# Patient Record
Sex: FEMALE | Race: WHITE | Employment: UNEMPLOYED | URBAN - METROPOLITAN AREA
[De-identification: names, ages, dates, MRNs, and addresses within clinical notes are randomized per-mention and may not be internally consistent; named-entity substitution may affect disease eponyms.]

---

## 2017-05-24 LAB
CHOLESTEROL: 151 MG/DL
GLUCOSE BLD-MCNC: 85 MG/DL (ref 70–140)
HDLC SERPL-MCNC: 78 MG/DL
LDL CHOLESTEROL CALCULATED: 63 MG/DL
PATIENT FASTING?: YES
TRIGL SERPL-MCNC: 52 MG/DL

## 2017-05-25 ENCOUNTER — EMPLOYEE WELLNESS (OUTPATIENT)
Dept: OTHER | Age: 37
End: 2017-05-25

## 2018-03-20 VITALS — WEIGHT: 163 LBS

## 2020-05-20 ENCOUNTER — HOSPITAL ENCOUNTER (OUTPATIENT)
Dept: GENERAL RADIOLOGY | Age: 40
Discharge: HOME | End: 2020-05-20
Payer: COMMERCIAL

## 2020-05-20 ENCOUNTER — OFFICE VISIT (OUTPATIENT)
Dept: FAMILY MEDICINE CLINIC | Age: 40
End: 2020-05-20
Payer: COMMERCIAL

## 2020-05-20 ENCOUNTER — HOSPITAL ENCOUNTER (OUTPATIENT)
Age: 40
Discharge: HOME | End: 2020-05-20
Payer: COMMERCIAL

## 2020-05-20 VITALS
OXYGEN SATURATION: 98 % | HEART RATE: 88 BPM | SYSTOLIC BLOOD PRESSURE: 140 MMHG | DIASTOLIC BLOOD PRESSURE: 92 MMHG | TEMPERATURE: 98.8 F

## 2020-05-20 PROCEDURE — 73610 X-RAY EXAM OF ANKLE: CPT

## 2020-05-20 PROCEDURE — G8427 DOCREV CUR MEDS BY ELIG CLIN: HCPCS | Performed by: NURSE PRACTITIONER

## 2020-05-20 PROCEDURE — G8421 BMI NOT CALCULATED: HCPCS | Performed by: NURSE PRACTITIONER

## 2020-05-20 PROCEDURE — 1036F TOBACCO NON-USER: CPT | Performed by: NURSE PRACTITIONER

## 2020-05-20 PROCEDURE — 99202 OFFICE O/P NEW SF 15 MIN: CPT | Performed by: NURSE PRACTITIONER

## 2020-05-20 RX ORDER — IBUPROFEN 200 MG
200 TABLET ORAL EVERY 6 HOURS PRN
COMMUNITY

## 2020-05-20 RX ORDER — NORGESTIMATE AND ETHINYL ESTRADIOL 7DAYSX3 28
1 KIT ORAL DAILY
COMMUNITY

## 2020-05-20 SDOH — HEALTH STABILITY: MENTAL HEALTH: HOW OFTEN DO YOU HAVE A DRINK CONTAINING ALCOHOL?: NEVER

## 2020-05-20 ASSESSMENT — PATIENT HEALTH QUESTIONNAIRE - PHQ9
SUM OF ALL RESPONSES TO PHQ9 QUESTIONS 1 & 2: 0
SUM OF ALL RESPONSES TO PHQ QUESTIONS 1-9: 0
1. LITTLE INTEREST OR PLEASURE IN DOING THINGS: 0
SUM OF ALL RESPONSES TO PHQ QUESTIONS 1-9: 0
2. FEELING DOWN, DEPRESSED OR HOPELESS: 0

## 2020-05-20 ASSESSMENT — ENCOUNTER SYMPTOMS
DIARRHEA: 0
RESPIRATORY NEGATIVE: 1
VOMITING: 0
NAUSEA: 0

## 2020-05-28 ENCOUNTER — OFFICE VISIT (OUTPATIENT)
Dept: ORTHOPEDIC SURGERY | Age: 40
End: 2020-05-28
Payer: COMMERCIAL

## 2020-05-28 VITALS — WEIGHT: 160 LBS | RESPIRATION RATE: 16 BRPM | BODY MASS INDEX: 27.31 KG/M2 | HEIGHT: 64 IN

## 2020-05-28 PROCEDURE — 27786 TREATMENT OF ANKLE FRACTURE: CPT | Performed by: ORTHOPAEDIC SURGERY

## 2020-05-28 PROCEDURE — 99203 OFFICE O/P NEW LOW 30 MIN: CPT | Performed by: ORTHOPAEDIC SURGERY

## 2020-05-28 PROCEDURE — G8419 CALC BMI OUT NRM PARAM NOF/U: HCPCS | Performed by: ORTHOPAEDIC SURGERY

## 2020-05-28 PROCEDURE — 1036F TOBACCO NON-USER: CPT | Performed by: ORTHOPAEDIC SURGERY

## 2020-05-28 PROCEDURE — G8427 DOCREV CUR MEDS BY ELIG CLIN: HCPCS | Performed by: ORTHOPAEDIC SURGERY

## 2020-05-28 RX ORDER — HYDROCODONE BITARTRATE AND ACETAMINOPHEN 5; 325 MG/1; MG/1
1 TABLET ORAL EVERY 8 HOURS PRN
Qty: 15 TABLET | Refills: 0 | Status: SHIPPED | OUTPATIENT
Start: 2020-05-28 | End: 2020-06-02

## 2020-05-28 RX ORDER — CHOLECALCIFEROL (VITAMIN D3) 1250 MCG
CAPSULE ORAL
COMMUNITY

## 2020-05-28 ASSESSMENT — ENCOUNTER SYMPTOMS
SHORTNESS OF BREATH: 0
CHEST TIGHTNESS: 0
EYE REDNESS: 0
WHEEZING: 0
COLOR CHANGE: 0
EYE PAIN: 0
VOMITING: 0

## 2020-05-28 NOTE — PROGRESS NOTES
5/28/2020   Chief Complaint   Patient presents with    Fracture     right distal fibula 5/17/2020        History of Present Illness:                             Kadien Velazquez is a 36 y.o. female presents today for evaluation of her right ankle pain and swelling. She had an injury on 5/17/2020 when she fell with a twisting injury to her right ankle. She had immediate pain and swelling maximally over the lateral aspect of her ankle after the fall. She was able to perform some partial weightbearing activities after the fall but does complain of significant discomfort in the lateral aspect of her ankle with any attempted movement or weightbearing activities of the ankle. She was seen by her primary care provider who ordered x-rays and referred her here for the fracture. Her pain is better with rest and elevation. She has not been immobilized but has been using an Ace wrap. Patient here for a new patient consult per Bulmaro Garcia CNP, for evaluation of right ankle pain due to an injury on 5/17/2020. She has not been WB on the ankle since a few days after being seen on 5/20. She rates her pain a 5/10 today. No prior history to this ankle.     Provider needs to conduct a hands on physical today due to patients injury     XR RIGHT ANKLE 5/20/2020  Impression   Acute nondisplaced traumatic fracture of the distal fibula/lateral malleolus.       The findings were sent to the Radiology Results [address] at 3:39   pm on 5/20/2020to be communicated to a licensed caregiver.              Medical History  Patient's medications, allergies, past medical, surgical, social and family histories were reviewed and updated as appropriate. No past medical history on file. No family history on file.   Social History     Socioeconomic History    Marital status: Single     Spouse name: None    Number of children: None    Years of education: None    Highest education level: None   Occupational History    None   Social Needs    Financial resource strain: None    Food insecurity     Worry: None     Inability: None    Transportation needs     Medical: None     Non-medical: None   Tobacco Use    Smoking status: Never Smoker    Smokeless tobacco: Never Used   Substance and Sexual Activity    Alcohol use: Never     Frequency: Never    Drug use: Never    Sexual activity: None   Lifestyle    Physical activity     Days per week: None     Minutes per session: None    Stress: None   Relationships    Social connections     Talks on phone: None     Gets together: None     Attends Christianity service: None     Active member of club or organization: None     Attends meetings of clubs or organizations: None     Relationship status: None    Intimate partner violence     Fear of current or ex partner: None     Emotionally abused: None     Physically abused: None     Forced sexual activity: None   Other Topics Concern    None   Social History Narrative    None     Current Outpatient Medications   Medication Sig Dispense Refill    Multiple Vitamins-Minerals (MULTI COMPLETE PO) Take by mouth      BIOTIN MAXIMUM PO Take by mouth      Cholecalciferol (VITAMIN D3) 1.25 MG (19738 UT) CAPS Take by mouth      Cetirizine-Pseudoephedrine (ALLERGY D-12 PO) Take by mouth      ibuprofen (ADVIL;MOTRIN) 200 MG tablet Take 200 mg by mouth every 6 hours as needed for Pain Take 2 as needed      Norgestim-Eth Estrad Triphasic (TRI-SPRINTEC) 0.18/0.215/0.25 MG-35 MCG TABS Take 1 tablet by mouth daily       No current facility-administered medications for this visit. No Known Allergies    Review of Systems:   Review of Systems   Constitutional: Negative for chills and fever. HENT: Negative for congestion and sneezing. Eyes: Negative for pain and redness. Respiratory: Negative for chest tightness, shortness of breath and wheezing. Cardiovascular: Negative for chest pain and palpitations.    Gastrointestinal: Negative for

## 2020-05-28 NOTE — PATIENT INSTRUCTIONS
Fitted for tall boot  Wear boot full time, remove for hygiene and sleep   Ok for partial weightbearing as needed   Crutches provided today  Script for norco provided, take as directed   Follow up in 1 week with x-rays

## 2020-06-04 ENCOUNTER — OFFICE VISIT (OUTPATIENT)
Dept: ORTHOPEDIC SURGERY | Age: 40
End: 2020-06-04

## 2020-06-04 VITALS
RESPIRATION RATE: 16 BRPM | HEART RATE: 76 BPM | WEIGHT: 160 LBS | OXYGEN SATURATION: 91 % | BODY MASS INDEX: 27.31 KG/M2 | HEIGHT: 64 IN

## 2020-06-04 PROCEDURE — 99024 POSTOP FOLLOW-UP VISIT: CPT | Performed by: ORTHOPAEDIC SURGERY

## 2020-06-04 NOTE — PROGRESS NOTES
6/4/2020   Chief Complaint   Patient presents with    Fracture     right distal fibula fx Hogue  injury 5/17/2020        History of Present Illness:                             Dania Romero is a 36 y.o. female who returns today for follow-up of her right ankle fracture. She has done well in her boot and feels that her pain and swelling are continuing to improve regularly. She has been able to perform some limited weightbearing to the ankle with her boot in place but continues to have pain with any pressure or weightbearing activities referred to the lateral aspect of the ankle. Patient returns today for FU of the right distal fibula fx Hogue injury 5/17/2020. Patient states pain today is a 3/10. She states that she is starting to feel overall better. The swelling has gone down in the right ankle. Patient states she has been wearing her boot at all times besides sleeping. Patient states that she has been using an ace wrap at night on her ankle. Patient denies any new injury or accident. Medical History  Patient's medications, allergies, past medical, surgical, social and family histories were reviewed and updated as appropriate. Review of Systems:   Review of Systems                                            Examination:  General Exam:  Vitals: Pulse 76   Resp 16   Ht 5' 4\" (1.626 m)   Wt 160 lb (72.6 kg)   LMP 05/14/2020 (Approximate)   SpO2 91%   BMI 27.46 kg/m²    Physical Exam   Right lower extremity:  Improved mild to moderate tenderness to palpation overlying the distal fibula with associated mild soft tissue swelling. No instability with gentle passive range of motion of dorsiflexion plantarflexion of the ankle. Sensation motor function is intact throughout. No tenderness to palpation overlying the medial malleolus. Mild tenderness across the anterior joint line of the ankle.       Diagnostic testing:  X-rays reviewed in office, I independently reviewed the films in the office today:     Xr Ankle Right (min 3 Views)    Result Date: 6/4/2020  3 views of the ankle show stable alignment of the nondisplaced Hogue B distal fibula fracture with maintained alignment of the ankle mortise and no widening of the syndesmosis or medial clear space. Office Procedures:  No orders of the defined types were placed in this encounter. Assessment and Plan  1. Right ankle nondisplaced Hogue B distal fibula fracture    I recommend that we continue with her a conservative treatment plan. I have recommended advancement of her weightbearing activities as tolerated in the boot. I have shown her gentle range of motion activities for the foot and toes to perform outside of the boot. She can continue to use her crutches as needed for support and protection while walking. I would like her to follow-up in 2 weeks for repeat x-rays to evaluate the fracture healing and alignment. If she is doing well we may consider transitioning to a brace at that time.         Electronically signed by Munir Basilio MD on 6/4/2020 at 2:49 PM

## 2020-06-04 NOTE — PATIENT INSTRUCTIONS
Continue weight-bearing as tolerated in cam boot   Continue range of motion exercises as instructed. Ice and elevate as needed. Tylenol or Motrin for pain.   Follow up in 2 weeks

## 2020-06-18 ENCOUNTER — OFFICE VISIT (OUTPATIENT)
Dept: ORTHOPEDIC SURGERY | Age: 40
End: 2020-06-18

## 2020-06-18 VITALS — HEIGHT: 64 IN | WEIGHT: 160.5 LBS | RESPIRATION RATE: 16 BRPM | BODY MASS INDEX: 27.4 KG/M2

## 2020-06-18 PROCEDURE — 99024 POSTOP FOLLOW-UP VISIT: CPT | Performed by: ORTHOPAEDIC SURGERY

## 2020-07-16 ENCOUNTER — OFFICE VISIT (OUTPATIENT)
Dept: ORTHOPEDIC SURGERY | Age: 40
End: 2020-07-16

## 2020-07-16 VITALS — WEIGHT: 160 LBS | BODY MASS INDEX: 27.31 KG/M2 | RESPIRATION RATE: 16 BRPM | HEIGHT: 64 IN

## 2020-07-16 PROCEDURE — 99024 POSTOP FOLLOW-UP VISIT: CPT | Performed by: ORTHOPAEDIC SURGERY

## 2020-07-16 NOTE — PROGRESS NOTES
Patient here for a follow up on her right distal fibula fracture, DOI 5/17/2020 she is about 2 months out from injury. She is in her ankle lace-up brace while ambulating and feels she is improving. She is still having some swelling in the ankle but no new issues. She does work on ROM of the ankle at home.     Provider needs to conduct a hands on physical today due to patients injury/diagnosis

## 2020-07-19 NOTE — PROGRESS NOTES
7/16/2020   Chief Complaint   Patient presents with    Ankle Injury     closed fracture of distal end of right fibula         History of Present Illness:                             Edd Buckner is a 36 y.o. female who returns today for follow-up of her right ankle fracture. She is doing well improving with her ankle but does not feel that she has recovered fully at this time. She is also has pain and swelling on the lateral aspect of the ankle but denies any instability issues. She has been using her lace up ankle brace and feels this gives her ankle good support and protection. Patient here for a follow up on her right distal fibula fracture, DOI 5/17/2020 she is about 2 months out from injury. She is in her ankle lace-up brace while ambulating and feels she is improving. She is still having some swelling in the ankle but no new issues. She does work on ROM of the ankle at home. Medical History  Patient's medications, allergies, past medical, surgical, social and family histories were reviewed and updated as appropriate. Review of Systems:   Review of Systems                                            Examination:  General Exam:  Vitals: Resp 16   Ht 5' 4\" (1.626 m)   Wt 160 lb (72.6 kg)   BMI 27.46 kg/m²    Physical Exam   Right lower extremity:  Improved mild tenderness and persistent mild swelling present of the lateral aspect of the ankle at the distal fibula fracture site. No instability with gentle active and passive range of motion of the ankle. Mild restricted range of motion at the ankle with discomfort at the extremes of motion. Strength is 5 out of 5 with ankle dorsiflexion and plantarflexion.       Diagnostic testing:  X-rays reviewed in office, I independently reviewed the films in the office today:     Repeat x-rays show stable alignment of the distal fibula fracture with interval bony healing at the fracture site and maintained alignment of the ankle mortise    Office Procedures:  No orders of the defined types were placed in this encounter. Assessment and Plan  1. Right distal fibula fracture    I reviewed the x-rays with her and explained there is stable alignment of the nondisplaced spiral oblique Hogue B distal fibula fracture. There is interval healing and callus formation at the fracture site. There is maintained alignment of the ankle mortise with no widening of the syndesmosis or medial clear space. I have explained to her that the fracture is healing well and I would recommend continued protection as needed in brace and avoidance of painful and strenuous activities at this time. We discussed the likelihood that the fracture will continue to improve and heal over the next month or 2. I would like her to follow-up in 4 weeks for repeat x-rays to evaluate fracture healing and alignment.         Electronically signed by Iglesia Delgado MD on 7/19/2020 at 5:19 PM

## 2020-09-10 ENCOUNTER — OFFICE VISIT (OUTPATIENT)
Dept: ORTHOPEDIC SURGERY | Age: 40
End: 2020-09-10
Payer: COMMERCIAL

## 2020-09-10 VITALS
OXYGEN SATURATION: 98 % | WEIGHT: 160 LBS | HEART RATE: 84 BPM | BODY MASS INDEX: 27.31 KG/M2 | HEIGHT: 64 IN | RESPIRATION RATE: 15 BRPM

## 2020-09-10 PROCEDURE — G8419 CALC BMI OUT NRM PARAM NOF/U: HCPCS | Performed by: ORTHOPAEDIC SURGERY

## 2020-09-10 PROCEDURE — 1036F TOBACCO NON-USER: CPT | Performed by: ORTHOPAEDIC SURGERY

## 2020-09-10 PROCEDURE — 99213 OFFICE O/P EST LOW 20 MIN: CPT | Performed by: ORTHOPAEDIC SURGERY

## 2020-09-10 PROCEDURE — G8427 DOCREV CUR MEDS BY ELIG CLIN: HCPCS | Performed by: ORTHOPAEDIC SURGERY

## 2020-09-10 ASSESSMENT — ENCOUNTER SYMPTOMS
SHORTNESS OF BREATH: 0
CHEST TIGHTNESS: 0
COLOR CHANGE: 0

## 2020-09-10 NOTE — PROGRESS NOTES
9/10/2020   Chief Complaint   Patient presents with    Ankle Injury     R-distal fibula fx mathis injury DOI 5/17/20        History of Present Illness:                             Christopher Wilcox is a 36 y.o. female who returns today for follow-up of her right ankle fracture. She has been using her brace for support. She does have pain and swelling of the right ankle especially the end of the day with repetitive activities. .  She does continue have a stiffness and increased discomfort with twisting and rotational movements of the ankle. Pain is isolated to the lateral aspect of the ankle at the fracture site. Pt returns today for R-distal fibula Fx mathis injury DOI 5/17/20. Pt states pain today is a 1/10. Pt states that she has been wearing her brace to help with support. She will have some swelling of the right ankle at the end of the day. Once she sits down and relaxes she will have reduced swelling. She does use ice to help with the swelling. Medical History  Patient's medications, allergies, past medical, surgical, social and family histories were reviewed and updated as appropriate. Review of Systems:   Review of Systems   Constitutional: Negative for activity change and fever. Respiratory: Negative for chest tightness and shortness of breath. Cardiovascular: Negative for chest pain. Skin: Negative for color change. Neurological: Negative for dizziness. Psychiatric/Behavioral: The patient is not nervous/anxious. Examination:  General Exam:  Vitals: Pulse 84   Resp 15   Ht 5' 4\" (1.626 m)   Wt 160 lb (72.6 kg)   SpO2 98%   BMI 27.46 kg/m²    Physical Exam  Vitals signs and nursing note reviewed. Constitutional:       Appearance: Normal appearance. HENT:      Head: Normocephalic and atraumatic. Eyes:      Conjunctiva/sclera: Conjunctivae normal.      Pupils: Pupils are equal, round, and reactive to light.    Neck: Musculoskeletal: Normal range of motion. Pulmonary:      Effort: Pulmonary effort is normal.   Musculoskeletal:      Right knee: She exhibits normal range of motion, no swelling, no deformity and no laceration. No tenderness found. Left ankle: She exhibits normal range of motion, no swelling, no ecchymosis, no deformity, no laceration and normal pulse. No tenderness. Achilles tendon normal.      Comments: Right lower extremity:  There is mild tenderness to palpation and swelling overlying the lateral aspect of the ankle at the distal fibula. Normal alignment of the ankle joint. There is mild restricted range of motion in ankle dorsiflexion and plantarflexion with pain referred to the lateral aspect of the ankle at the extremes of motion. Moderate restricted range of motion with inversion and eversion of the hindfoot. No instability with inversion stress testing and external rotation stress testing but there is pain referred to the lateral aspect of the ankle of the distal fibula with stress examination. Sensation and motor function is intact throughout the foot. Skin is intact. 2+ DP pulse and brisk cap refill present. Skin:     General: Skin is warm and dry. Neurological:      Mental Status: She is alert and oriented to person, place, and time. Psychiatric:         Mood and Affect: Mood normal.         Behavior: Behavior normal.              Diagnostic testing:  X-rays reviewed in office, I independently reviewed the films in the office today:   Xr Ankle Right (min 3 Views)    Result Date: 9/10/2020  3 views of the ankle show stable alignment of the Hogue B oblique distal fibula fracture with persistent fracture line present and mild posterior lateral displacement of the distal fragment less than 2 mm. Normal alignment of the ankle mortise and syndesmosis. No significant interval bony healing or callus formation present.         Office Procedures:  No orders of the defined types were placed in this encounter. Assessment and Plan  1. 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 There has been no progression of the bony healing since the previous x-rays. Based on the chronicity of her problem and the lack of healing I have diagnosed her as a nonunion. We discussed the healing process and have explained to her that her fracture is taking longer to heal than expected. Due to her ongoing symptoms and lack of healing on the x-rays, I have recommended we proceed with a bone stimulator to help aid in the healing of her fibula fracture. I have sent a referral to [facility] for this. I would like her to follow-up here in 6 weeks for a recheck and x-rays. She can continue with light activities and use of her brace for protection during ambulation.         Electronically signed by Tracey Ovalle MD on 9/10/2020 at 2:22 PM

## 2020-09-10 NOTE — PATIENT INSTRUCTIONS
Continue weight-bearing as tolerated. Continue range of motion exercises as instructed. Ice and elevate as needed. Tylenol or Motrin for pain.   Follow up in 6 weeks   Get bone stimulator approved

## 2020-12-03 ENCOUNTER — OFFICE VISIT (OUTPATIENT)
Dept: ORTHOPEDIC SURGERY | Age: 40
End: 2020-12-03
Payer: COMMERCIAL

## 2020-12-03 VITALS
HEART RATE: 100 BPM | OXYGEN SATURATION: 98 % | BODY MASS INDEX: 27.31 KG/M2 | WEIGHT: 160 LBS | HEIGHT: 64 IN | RESPIRATION RATE: 16 BRPM

## 2020-12-03 PROCEDURE — 99213 OFFICE O/P EST LOW 20 MIN: CPT | Performed by: ORTHOPAEDIC SURGERY

## 2020-12-03 PROCEDURE — G8427 DOCREV CUR MEDS BY ELIG CLIN: HCPCS | Performed by: ORTHOPAEDIC SURGERY

## 2020-12-03 PROCEDURE — 1036F TOBACCO NON-USER: CPT | Performed by: ORTHOPAEDIC SURGERY

## 2020-12-03 PROCEDURE — G8484 FLU IMMUNIZE NO ADMIN: HCPCS | Performed by: ORTHOPAEDIC SURGERY

## 2020-12-03 PROCEDURE — G8419 CALC BMI OUT NRM PARAM NOF/U: HCPCS | Performed by: ORTHOPAEDIC SURGERY

## 2020-12-03 ASSESSMENT — ENCOUNTER SYMPTOMS
COLOR CHANGE: 0
CHEST TIGHTNESS: 0
SHORTNESS OF BREATH: 0

## 2020-12-03 NOTE — PROGRESS NOTES
Patient returns to the office for a follow up on a right distal fibula fracture that she sustained on on 5/17/2020. Patient continues to wear ankle brace for support and has been using bone stimulator daily with continued swelling and pain depending upon activity such as driving and circular motion. Pain is rated between a 4-5/10. No new injury reported today.

## 2020-12-03 NOTE — LETTER
[facility] [facility]  [address]  Phone: [phone]  Fax: [phone]    America Kimbrough MD        December 3, 2020     Patient: Abad Canales   YOB: 1980   Date of Visit: 12/3/2020       To Whom it May Concern:    Johnny Belcher was seen in my clinic on 12/3/2020. She should remain off work at this time until follow up appointment in one month. At that time, patient will be re-evaluated for previous injury. If you have any questions or concerns, please don't hesitate to call.     Sincerely,         America Kimbrough MD

## 2020-12-03 NOTE — LETTER
[address] [facility]  [address]  Phone: [phone]  Fax: [phone]    Amparo Demarco MD        December 3, 2020     Patient: Adeola Pate   YOB: 1980   Date of Visit: 12/3/2020       To Whom it May Concern:    Marylene Spice was seen in my clinic on 12/3/2020 for follow up appointment in relation to ankle injury that she sustained in May. Patient is to remain off of work until   If you have any questions or concerns, please don't hesitate to call.     Sincerely,         Amparo Demarco MD

## 2020-12-03 NOTE — PROGRESS NOTES
12/3/2020   Chief Complaint   Patient presents with    Fracture     right distal fibula DOI 5/17/2020        History of Present Illness:                             Violeta Aguilar is a 36 y.o. female returns today for follow-up of her right ankle. She has been using her brace for support. She has been using her bone stimulator daily. She continues to have some occasional deep aching pains at the healing fracture site of the lateral malleolus. She denies any instability or new injuries to the area. Pain is worse with repetitive walking or weightbearing activities or potential twisting actions on the ankle. Patient returns to the office for a follow up on a right distal fibula fracture that she sustained on on 5/17/2020. Patient continues to wear ankle brace for support and has been using bone stimulator daily with continued swelling and pain depending upon activity such as driving and circular motion. Pain is rated between a 4-5/10. No new injury reported today. Medical History  Patient's medications, allergies, past medical, surgical, social and family histories were reviewed and updated as appropriate. Review of Systems:   Review of Systems   Constitutional: Negative for activity change and fever. Respiratory: Negative for chest tightness and shortness of breath. Cardiovascular: Negative for chest pain. Skin: Negative for color change. Neurological: Negative for dizziness, weakness and numbness. Psychiatric/Behavioral: The patient is not nervous/anxious. Examination:  General Exam:  Vitals: Pulse 100   Resp 16   Ht 5' 4\" (1.626 m)   Wt 160 lb (72.6 kg)   SpO2 98%   BMI 27.46 kg/m²    Physical Exam   Right lower extremity:  Mild persistent tenderness to palpation overlying the lateral malleolus. No tenderness to palpation at the medial ankle. No pain with active range of motion the ankle.   Range of motion is smooth and full and painless. Strength is 5 out of 5 with ankle dorsiflexion and plantarflexion as well as eversion and inversion of the hindfoot. No instability with gentle stress manipulation across the ankle. Sensation and motor function is intact throughout the foot and ankle. Skin is intact. Mild swelling laterally at the ankle. Diagnostic testing:  X-rays reviewed in office, I independently reviewed the films in the office today:     Xr Ankle Right (min 3 Views)    Result Date: 12/3/2020  3 views of the ankle show stable alignment of the minimally displaced Hogue B lateral malleolus fracture with maintained alignment of the ankle mortise and syndesmosis. There is minimal evidence of healing at the fracture site with no significant consolidation present. Office Procedures:  No orders of the defined types were placed in this encounter. Assessment and Plan  1. Right ankle mildly displaced Hogue B lateral malleolus fracture with delayed healing    I reviewed the x-rays with the patient explained that there is evidence of mild progression of the healing however there is still a persistent fracture line present across the lateral malleolus. I have reassured her that there is still good alignment of her ankle and would recommend that we continue with her nonoperative treatment. Continue with bracing and use of the bone stimulator. Continue to avoid strenuous painful or repetitive activities on the ankle. Follow-up in 1 month for repeat x-rays to evaluate fracture healing. We briefly discussed the potential for surgical intervention in the future if she is failing nonoperative treatments and has a persistent fracture nonunion.         Electronically signed by Marcela Rose MD on 12/3/2020 at 4:45 PM

## 2020-12-03 NOTE — PATIENT INSTRUCTIONS
Continue to use bone stimulator daily to promote healing  Wear ankle brace for support  Rest, ice, and elevate as needed  Continue to work on ROM and strengthening exercises   May take Ibuprofen or Motrin as needed for pain  Weightbearing and activities as tolerated  Remain off of work until follow up appointment  Follow up in one month

## 2021-06-17 ENCOUNTER — OFFICE VISIT (OUTPATIENT)
Dept: ORTHOPEDIC SURGERY | Age: 41
End: 2021-06-17
Payer: COMMERCIAL

## 2021-06-17 VITALS
WEIGHT: 160 LBS | HEART RATE: 86 BPM | HEIGHT: 64 IN | OXYGEN SATURATION: 98 % | BODY MASS INDEX: 27.31 KG/M2 | RESPIRATION RATE: 16 BRPM

## 2021-06-17 DIAGNOSIS — S82.831D OTHER CLOSED FRACTURE OF DISTAL END OF RIGHT FIBULA WITH ROUTINE HEALING, SUBSEQUENT ENCOUNTER: Primary | ICD-10-CM

## 2021-06-17 PROCEDURE — G8427 DOCREV CUR MEDS BY ELIG CLIN: HCPCS | Performed by: ORTHOPAEDIC SURGERY

## 2021-06-17 PROCEDURE — G8419 CALC BMI OUT NRM PARAM NOF/U: HCPCS | Performed by: ORTHOPAEDIC SURGERY

## 2021-06-17 PROCEDURE — 99213 OFFICE O/P EST LOW 20 MIN: CPT | Performed by: ORTHOPAEDIC SURGERY

## 2021-06-17 PROCEDURE — 1036F TOBACCO NON-USER: CPT | Performed by: ORTHOPAEDIC SURGERY

## 2021-06-17 NOTE — PATIENT INSTRUCTIONS
Weightbearing and activities as tolerated  May continue to take Ibuprofen as needed  Rest, ice, and elevate as needed  Work on ROM and strengthening exercises as tolerated  Follow up as needed

## 2021-06-17 NOTE — PROGRESS NOTES
Patient returns to the office with complaints of continued right ankle pain, difficulty stair climbing,  and intermittent episodes of burning sensation along the lateral aspect of the ankle. Associated sx: swelling and stiffness upon prolonged periods of activity. No new or worsening of symptoms reported. She continues to treat her symptoms conservatively by the use of Ibuprofen as needed. Overall, patient is ambulatory without complication in regular shoe attire with no new injury reported.

## 2021-06-18 PROBLEM — S82.831D CLOSED FRACTURE OF DISTAL END OF RIGHT FIBULA WITH ROUTINE HEALING: Status: ACTIVE | Noted: 2021-06-18

## 2021-06-18 ASSESSMENT — ENCOUNTER SYMPTOMS
COLOR CHANGE: 0
SHORTNESS OF BREATH: 0
CHEST TIGHTNESS: 0

## 2021-06-18 NOTE — PROGRESS NOTES
6/17/2021   Chief Complaint   Patient presents with    Ankle Pain     right        History of Present Illness:                             Mirian Martinez is a 39 y.o. female returns today for follow-up of her right ankle. She has noticed some improvement in the ankle pain and swelling issues but continues to have some stiffness and occasional discomfort in the lateral aspect of her ankle. Overall she is improved and has resumed her normal activities with walking around well in regular shoes. She has had no recurrent instability issues or new injuries. Patient returns to the office with complaints of continued right ankle pain, difficulty stair climbing,  and intermittent episodes of burning sensation along the lateral aspect of the ankle. Associated sx: swelling and stiffness upon prolonged periods of activity. No new or worsening of symptoms reported. She continues to treat her symptoms conservatively by the use of Ibuprofen as needed. Overall, patient is ambulatory without complication in regular shoe attire with no new injury reported    Medical History  Patient's medications, allergies, past medical, surgical, social and family histories were reviewed and updated as appropriate. Review of Systems   Constitutional: Negative for activity change and fever. Respiratory: Negative for chest tightness and shortness of breath. Cardiovascular: Negative for chest pain. Skin: Negative for color change. Neurological: Negative for dizziness. Psychiatric/Behavioral: The patient is not nervous/anxious. Examination:  General Exam:  Vitals: Pulse 86   Resp 16   Ht 5' 4\" (1.626 m)   Wt 160 lb (72.6 kg)   SpO2 98%   BMI 27.46 kg/m²    Physical Exam     Right lower extremity:  Minimal tenderness palpation over the lateral malleolus. No swelling or joint effusion at the ankle.   No instability to anterior drawer and inversion stress testing or external rotation stress testing of the ankle. Strength is 5/5 with ankle flexion and extension. Sensation is intact to light touch throughout. 2+ DP pulse and brisk cap refill present. Diagnostic testing:  X-rays reviewed in office, I independently reviewed the films in the office today:     XR ANKLE RIGHT (MIN 3 VIEWS)    Result Date: 6/17/2021  3 views of the right ankle show evidence of healing with consolidation across the lateral malleolus [place] B fracture with maintained position of the distal fibula and ankle mortise. No joint space narrowing or posttraumatic degenerative changes. Office Procedures:  No orders of the defined types were placed in this encounter. Assessment and Plan  1. Right distal fibula fracture, healed    I reassured the patient that her x-rays show excellent consolidation and healing along the previous fracture site. It looks much improved from her previous x-rays. We discussed the prolonged healing process that she encountered however I do expect her ankle to be functional and durable moving forward. She can advance her activities as tolerated. We discussed potential for physical therapy if she is having difficulties with strength or range of motion. She will proceed with a home exercise program.  She will call if she like to have a prescription for physical therapy.     Follow-up as needed          Electronically signed by Cm Naranjo MD on 6/18/2021 at 7:24 AM